# Patient Record
Sex: FEMALE | Race: WHITE | NOT HISPANIC OR LATINO | URBAN - METROPOLITAN AREA
[De-identification: names, ages, dates, MRNs, and addresses within clinical notes are randomized per-mention and may not be internally consistent; named-entity substitution may affect disease eponyms.]

---

## 2023-05-09 ENCOUNTER — OUTPATIENT (OUTPATIENT)
Dept: OUTPATIENT SERVICES | Facility: HOSPITAL | Age: 53
LOS: 1 days | End: 2023-05-09

## 2023-05-09 ENCOUNTER — TRANSCRIPTION ENCOUNTER (OUTPATIENT)
Age: 53
End: 2023-05-09

## 2023-05-09 VITALS
DIASTOLIC BLOOD PRESSURE: 79 MMHG | RESPIRATION RATE: 16 BRPM | HEART RATE: 62 BPM | WEIGHT: 156.09 LBS | SYSTOLIC BLOOD PRESSURE: 139 MMHG | TEMPERATURE: 97 F | OXYGEN SATURATION: 99 % | HEIGHT: 65 IN

## 2023-05-09 DIAGNOSIS — S41.111A LACERATION WITHOUT FOREIGN BODY OF RIGHT UPPER ARM, INITIAL ENCOUNTER: ICD-10-CM

## 2023-05-09 DIAGNOSIS — S41.112A LACERATION WITHOUT FOREIGN BODY OF LEFT UPPER ARM, INITIAL ENCOUNTER: ICD-10-CM

## 2023-05-09 DIAGNOSIS — S66.326D LACERATION OF EXTENSOR MUSCLE, FASCIA AND TENDON OF RIGHT LITTLE FINGER AT WRIST AND HAND LEVEL, SUBSEQUENT ENCOUNTER: ICD-10-CM

## 2023-05-09 DIAGNOSIS — E03.9 HYPOTHYROIDISM, UNSPECIFIED: ICD-10-CM

## 2023-05-09 DIAGNOSIS — E89.0 POSTPROCEDURAL HYPOTHYROIDISM: Chronic | ICD-10-CM

## 2023-05-09 LAB
ANION GAP SERPL CALC-SCNC: 13 MMOL/L — SIGNIFICANT CHANGE UP (ref 7–14)
BUN SERPL-MCNC: 10 MG/DL — SIGNIFICANT CHANGE UP (ref 7–23)
CALCIUM SERPL-MCNC: 10.3 MG/DL — SIGNIFICANT CHANGE UP (ref 8.4–10.5)
CHLORIDE SERPL-SCNC: 102 MMOL/L — SIGNIFICANT CHANGE UP (ref 98–107)
CO2 SERPL-SCNC: 26 MMOL/L — SIGNIFICANT CHANGE UP (ref 22–31)
CREAT SERPL-MCNC: 0.68 MG/DL — SIGNIFICANT CHANGE UP (ref 0.5–1.3)
EGFR: 104 ML/MIN/1.73M2 — SIGNIFICANT CHANGE UP
GLUCOSE SERPL-MCNC: 102 MG/DL — HIGH (ref 70–99)
HCG UR QL: NEGATIVE — SIGNIFICANT CHANGE UP
HCT VFR BLD CALC: 40.1 % — SIGNIFICANT CHANGE UP (ref 34.5–45)
HGB BLD-MCNC: 13.6 G/DL — SIGNIFICANT CHANGE UP (ref 11.5–15.5)
MCHC RBC-ENTMCNC: 32.1 PG — SIGNIFICANT CHANGE UP (ref 27–34)
MCHC RBC-ENTMCNC: 33.9 GM/DL — SIGNIFICANT CHANGE UP (ref 32–36)
MCV RBC AUTO: 94.6 FL — SIGNIFICANT CHANGE UP (ref 80–100)
NRBC # BLD: 0 /100 WBCS — SIGNIFICANT CHANGE UP (ref 0–0)
NRBC # FLD: 0 K/UL — SIGNIFICANT CHANGE UP (ref 0–0)
PLATELET # BLD AUTO: 267 K/UL — SIGNIFICANT CHANGE UP (ref 150–400)
POTASSIUM SERPL-MCNC: 4.7 MMOL/L — SIGNIFICANT CHANGE UP (ref 3.5–5.3)
POTASSIUM SERPL-SCNC: 4.7 MMOL/L — SIGNIFICANT CHANGE UP (ref 3.5–5.3)
RBC # BLD: 4.24 M/UL — SIGNIFICANT CHANGE UP (ref 3.8–5.2)
RBC # FLD: 13.2 % — SIGNIFICANT CHANGE UP (ref 10.3–14.5)
SODIUM SERPL-SCNC: 141 MMOL/L — SIGNIFICANT CHANGE UP (ref 135–145)
WBC # BLD: 4.73 K/UL — SIGNIFICANT CHANGE UP (ref 3.8–10.5)
WBC # FLD AUTO: 4.73 K/UL — SIGNIFICANT CHANGE UP (ref 3.8–10.5)

## 2023-05-09 NOTE — H&P PST ADULT - ASSESSMENT
Preop dx Laceration  Preop dx Laceration   S/P failed attempt to repair extensor tendon to right little finger in zone I

## 2023-05-09 NOTE — H&P PST ADULT - PROBLEM SELECTOR PLAN 2
Scheduled for percutaneous fixation of right little finger severe distal phalanx and revision repair extension tendon.   Written & verbal preop instructions, gi prophylaxis  Pt verbalized good understanding.  case discussed with Dr Lawrence Scheduled for percutaneous fixation of right little finger distal phalanx and revision repair extension tendon.   Written & verbal preop instructions, gi prophylaxis  Pt verbalized good understanding.  case discussed with Dr Lawrence

## 2023-05-09 NOTE — H&P PST ADULT - NSICDXPASTMEDICALHX_GEN_ALL_CORE_FT
PAST MEDICAL HISTORY:   (Normal Spontaneous Vaginal Delivery) x 2     Thyroid Cancer     Thyroid Nodule

## 2023-05-09 NOTE — H&P PST ADULT - NSICDXPASTSURGICALHX_GEN_ALL_CORE_FT
PAST SURGICAL HISTORY:  History of D&C-2004     Stress Incontinence- s/p Pelvic Sling ( 01/2011)      PAST SURGICAL HISTORY:  H/O total thyroidectomy     History of D&C-2004     Stress Incontinence- s/p Pelvic Sling ( 01/2011)

## 2023-05-09 NOTE — H&P PST ADULT - PROBLEM SELECTOR PLAN 1
Pt instructed to take unithroid  on morning of surgery. Pt instructed to take Unithroid on morning of surgery.

## 2023-05-09 NOTE — H&P PST ADULT - ATTENDING COMMENTS
I have amended the H&P to explain the proposed operation to revise a failed repair of extensor tendon in zone I  on right little finger

## 2023-05-09 NOTE — H&P PST ADULT - HISTORY OF PRESENT ILLNESS
53y/o female presents for preop eval for scheduled  percutaneous fixation of right little fingerdistal phalanx and revision repair extension tendon  53y/o female presents for preop eval for scheduled  percutaneous fixation of right little finger severe distal phalanx and revision repair extension tendon.  Pt states approx 2 weeks ago accidentally cut right little finger.  Evaluated in [facility] ER.

## 2023-05-10 ENCOUNTER — OUTPATIENT (OUTPATIENT)
Dept: OUTPATIENT SERVICES | Facility: HOSPITAL | Age: 53
LOS: 1 days | Discharge: HOME | End: 2023-05-10

## 2023-05-10 ENCOUNTER — TRANSCRIPTION ENCOUNTER (OUTPATIENT)
Age: 53
End: 2023-05-10

## 2023-05-10 VITALS
OXYGEN SATURATION: 97 % | HEART RATE: 62 BPM | SYSTOLIC BLOOD PRESSURE: 109 MMHG | DIASTOLIC BLOOD PRESSURE: 65 MMHG | RESPIRATION RATE: 18 BRPM | TEMPERATURE: 98 F

## 2023-05-10 VITALS
RESPIRATION RATE: 20 BRPM | OXYGEN SATURATION: 99 % | HEART RATE: 49 BPM | DIASTOLIC BLOOD PRESSURE: 73 MMHG | SYSTOLIC BLOOD PRESSURE: 112 MMHG | TEMPERATURE: 98 F | WEIGHT: 156.09 LBS

## 2023-05-10 DIAGNOSIS — E89.0 POSTPROCEDURAL HYPOTHYROIDISM: Chronic | ICD-10-CM

## 2023-05-10 DIAGNOSIS — S66.326D LACERATION OF EXTENSOR MUSCLE, FASCIA AND TENDON OF RIGHT LITTLE FINGER AT WRIST AND HAND LEVEL, SUBSEQUENT ENCOUNTER: ICD-10-CM

## 2023-05-10 DEVICE — K-WIRE S&N DOUBLE TROCAR 0.045" X 4": Type: IMPLANTABLE DEVICE | Status: FUNCTIONAL

## 2023-05-10 DEVICE — K-WIRE S&N DOUBLE TROCAR 0.062" X 4": Type: IMPLANTABLE DEVICE | Status: FUNCTIONAL

## 2023-05-10 RX ORDER — LEVOTHYROXINE SODIUM 125 MCG
1 TABLET ORAL
Refills: 0 | DISCHARGE

## 2023-05-10 NOTE — ASU DISCHARGE PLAN (ADULT/PEDIATRIC) - FOLLOW UP APPOINTMENTS
[facility], [facility] ([facility])

## 2023-05-10 NOTE — ASU DISCHARGE PLAN (ADULT/PEDIATRIC) - ASU DC SPECIAL INSTRUCTIONSFT
Please follow up with Dr. Alexander in 1 week. You can call his office to set up an appointment. You can take tylenol/ibuprofen for pain control. You should keep your dressing on and dry for 48 hours. After which, you can remove splint and dressing to shower. When you remove the dressing, please replace with a bandaid. You should then put the metal splint back on to protect your finger. You can put a small piece of tape around the metal splint to keep it in place.

## 2023-05-10 NOTE — BRIEF OPERATIVE NOTE - OPERATION/FINDINGS
Percutaneous pinning of 5th R ray DIP. Repair of extensor tendon defect 2/2 mallet finger. Closed primarily

## 2023-05-10 NOTE — ASU DISCHARGE PLAN (ADULT/PEDIATRIC) - NS MD DC FALL RISK RISK
For information on Fall & Injury Prevention, visit: [email or web address] OR  [email or web address] OR  https://www.cdc.gov/steadi/patient.html

## 2023-05-10 NOTE — ASU DISCHARGE PLAN (ADULT/PEDIATRIC) - CARE PROVIDER_API CALL
Fox Alexander)  Plastic Surgery  [address]  [place] [address]  Phone: [phone]  Fax: [phone]  Follow Up Time: 1 week

## 2023-05-10 NOTE — ASU PREOPERATIVE ASSESSMENT, ADULT ([facility]ARK ONLY) - FALL HARM RISK - UNIVERSAL INTERVENTIONS
Bed in lowest position, wheels locked, appropriate side rails in place/Call bell, personal items and telephone in reach/Instruct patient to call for assistance before getting out of bed or chair/Non-slip footwear when patient is out of bed/[place] to call system/Physically safe environment - no spills, clutter or unnecessary equipment/Purposeful Proactive Rounding/Room/bathroom lighting operational, light cord in reach

## 2023-06-07 NOTE — ASU PREOP CHECKLIST - AS TEMP SITE
forehead Sarecycline Counseling: Patient advised regarding possible photosensitivity and discoloration of the teeth, skin, lips, tongue and gums.  Patient instructed to avoid sunlight, if possible.  When exposed to sunlight, patients should wear protective clothing, sunglasses, and sunscreen.  The patient was instructed to call the office immediately if the following severe adverse effects occur:  hearing changes, easy bruising/bleeding, severe headache, or vision changes.  The patient verbalized understanding of the proper use and possible adverse effects of sarecycline.  All of the patient's questions and concerns were addressed.

## 2024-07-25 NOTE — ASU PREOPERATIVE ASSESSMENT, ADULT ([facility] ONLY) - HEART RATE (BEATS/MIN)
Case Management/Social Work    Patient Name:  Lynne Sanchez  YOB: 1935  MRN: [id number]  Admit Date:  7/22/2024        09:36 EDT  Met with patient at bedside. She plans to discharge home with home health, no preference for company. CM will continue to follow.      Electronically signed by:  Elio Demarco RN  07/25/24 09:36 EDT   49

## 2025-03-26 NOTE — ASU PREOP CHECKLIST - HAND OFF
Writer advised patient of NPs instructions. Patient agreed with plan and appreciative for script for Nurtec and Emgality. Advised to contact [place] pharmacy for fill issues.   
Writer spoke with [facility] pharmacy [facility]. He stated he has never spoken with patient and medication will be ready tomorrow at 3 pm. Patient has not p/u medications since September. Writer asked [facility] to hold off on refill until writer calls back.     Writer spoke with patient and verified she has not used EMgality in over 3 months. Writer explained Emgality would be reordered as patient will need to start with loading dose again. Patient verbalized understanding.     Patient requesting medication be sent to [place] pharmacy. She has too many issues with [facility].   
Unit RN to OR RN

## (undated) DEVICE — SUT MONOCRYL 5-0 18" P-3

## (undated) DEVICE — PACK HAND

## (undated) DEVICE — LABELS BLANK W PEN

## (undated) DEVICE — VENODYNE/SCD SLEEVE CALF LARGE

## (undated) DEVICE — POSITIONER FOAM EGG CRATE ULNAR 2PCS (PINK)

## (undated) DEVICE — WARMING BLANKET LOWER ADULT

## (undated) DEVICE — VENODYNE/SCD SLEEVE CALF BARIATRIC

## (undated) DEVICE — DRSG STERISTRIPS 0.5 X 4"

## (undated) DEVICE — GLV 7.5 PROTEXIS (WHITE)

## (undated) DEVICE — SOL IRR POUR NS 0.9% 1500ML

## (undated) DEVICE — VENODYNE/SCD SLEEVE CALF MEDIUM

## (undated) DEVICE — POSITIONER PATIENT SAFETY STRAP 3X60"

## (undated) DEVICE — YELLOW PIN COVER

## (undated) DEVICE — ELCTR GROUNDING PAD ADULT COVIDIEN